# Patient Record
Sex: MALE | Race: WHITE | NOT HISPANIC OR LATINO
[De-identification: names, ages, dates, MRNs, and addresses within clinical notes are randomized per-mention and may not be internally consistent; named-entity substitution may affect disease eponyms.]

---

## 2018-10-02 ENCOUNTER — APPOINTMENT (OUTPATIENT)
Dept: ORTHOPEDIC SURGERY | Facility: CLINIC | Age: 58
End: 2018-10-02
Payer: COMMERCIAL

## 2018-10-02 VITALS
SYSTOLIC BLOOD PRESSURE: 132 MMHG | BODY MASS INDEX: 25.77 KG/M2 | DIASTOLIC BLOOD PRESSURE: 76 MMHG | WEIGHT: 180 LBS | HEIGHT: 70 IN | HEART RATE: 60 BPM

## 2018-10-02 DIAGNOSIS — Z78.9 OTHER SPECIFIED HEALTH STATUS: ICD-10-CM

## 2018-10-02 DIAGNOSIS — M25.531 PAIN IN RIGHT WRIST: ICD-10-CM

## 2018-10-02 DIAGNOSIS — M25.831 OTHER SPECIFIED JOINT DISORDERS, RIGHT WRIST: ICD-10-CM

## 2018-10-02 DIAGNOSIS — M25.532 PAIN IN LEFT WRIST: ICD-10-CM

## 2018-10-02 DIAGNOSIS — M24.132 OTHER ARTICULAR CARTILAGE DISORDERS, LEFT WRIST: ICD-10-CM

## 2018-10-02 DIAGNOSIS — M24.131 OTHER ARTICULAR CARTILAGE DISORDERS, RIGHT WRIST: ICD-10-CM

## 2018-10-02 PROCEDURE — 20605 DRAIN/INJ JOINT/BURSA W/O US: CPT | Mod: LT

## 2018-10-02 PROCEDURE — 99203 OFFICE O/P NEW LOW 30 MIN: CPT | Mod: 25

## 2018-10-03 PROBLEM — M25.531 RIGHT WRIST PAIN: Status: ACTIVE | Noted: 2018-10-03

## 2018-10-03 PROBLEM — Z78.9 DOES NOT USE ILLICIT DRUGS: Status: ACTIVE | Noted: 2018-10-02

## 2018-10-03 PROBLEM — Z78.9 CONSUMES ALCOHOL OCCASIONALLY: Status: ACTIVE | Noted: 2018-10-02

## 2018-10-03 PROBLEM — Z78.9 CURRENT NON-SMOKER: Status: ACTIVE | Noted: 2018-10-02

## 2018-10-03 PROBLEM — M25.532 LEFT WRIST PAIN: Status: ACTIVE | Noted: 2018-10-03

## 2018-10-16 ENCOUNTER — TRANSCRIPTION ENCOUNTER (OUTPATIENT)
Age: 58
End: 2018-10-16

## 2020-01-06 ENCOUNTER — APPOINTMENT (OUTPATIENT)
Age: 60
End: 2020-01-06
Payer: COMMERCIAL

## 2020-01-06 VITALS
BODY MASS INDEX: 27.2 KG/M2 | HEIGHT: 70 IN | SYSTOLIC BLOOD PRESSURE: 134 MMHG | HEART RATE: 74 BPM | TEMPERATURE: 97.9 F | DIASTOLIC BLOOD PRESSURE: 85 MMHG | WEIGHT: 190 LBS

## 2020-01-06 DIAGNOSIS — M75.21 BICIPITAL TENDINITIS, RIGHT SHOULDER: ICD-10-CM

## 2020-01-06 DIAGNOSIS — M75.41 IMPINGEMENT SYNDROME OF RIGHT SHOULDER: ICD-10-CM

## 2020-01-06 DIAGNOSIS — S43.431A SUPERIOR GLENOID LABRUM LESION OF RIGHT SHOULDER, INITIAL ENCOUNTER: ICD-10-CM

## 2020-01-06 PROCEDURE — 73030 X-RAY EXAM OF SHOULDER: CPT | Mod: RT

## 2020-01-06 PROCEDURE — 99214 OFFICE O/P EST MOD 30 MIN: CPT | Mod: 25

## 2020-01-06 PROCEDURE — 20610 DRAIN/INJ JOINT/BURSA W/O US: CPT | Mod: RT

## 2020-01-16 NOTE — HISTORY OF PRESENT ILLNESS
[de-identified] : The patient comes in today.  He has been doing a lot of swimming.  He is having increasing complaints of pain to his right shoulder.  The patient states the onset/injury occurred on 10/20/19.  This injury is not work related or due to an automobile accident.  The patient states the pain is localized.  The patient describes the pain as sharp.  \par \par Pain level includes a current pain level of 6/10.

## 2020-01-16 NOTE — ADDENDUM
[FreeTextEntry1] : This note was written by Margareth Bellamy on 01/10/2020 acting as scribe for José Manuel Ziegler III, MD

## 2020-01-16 NOTE — DISCUSSION/SUMMARY
[de-identified] : At this time, I have recommended that he undergo an injection for the right shoulder impingement, biceps tendinitis and SLAP tear, as noted above.  I have recommended ice and elevation.  He will be reassessed in three to four weeks.

## 2020-01-16 NOTE — PROCEDURE
[de-identified] : Consent: \par At this time, I have recommended an injection to the right shoulder.  The risks and benefits of the procedure were discussed with the patient in detail.  Upon verbal consent of the patient, we proceeded with the injection as noted below.  \par \par Procedure:  \par After a sterile prep, the patient underwent an injection of 9 cc of 1% Lidocaine without epinephrine and 1 cc of Kenalog into the right shoulder.  The patient tolerated the procedure well.  There were no complications.

## 2020-01-16 NOTE — PHYSICAL EXAM
[de-identified] : Left Shoulder: \par Range of Motion in Degrees:   	\par    	                        Claimant:          Normal:  	\par Abduction (Active)  	180  	180 degrees  	\par Abduction (Passive)  	180  	180 degrees  	\par Forward elevation (Active):  	180  	180 degrees  	\par Forward elevation (Passive):  180  	180 degrees  	\par External rotation (Active):  	45  	45 degrees  	\par External rotation (Passive):  	45  	45 degrees  	\par Internal rotation (Active):  	L-1  	L-1  	\par Internal rotation (Passive):  	L-1  	L-1  	\par \par No motor weakness to internal rotation, external rotation or abduction in the scapular plane.  Negative crank test.  Negative O’Noah’s test.  Negative Speed’s test. Negative Yergason’s test.  Negative cross arm test.  No tenderness to palpation at the AC joint. Negative Hawkin’s sign.  Negative Neer’s sign.  Negative apprehension. Negative sulcus sign.  No gross neurological or vascular deficits distally.  2+ radial and ulnar pulses.  Skin is intact.  No rashes, scars or lesions.  No extra-articular swelling or tenderness. \par \par Right Shoulder:  \par Range of Motion in Degrees:	\par 	                                  Claimant:	Normal:	\par Abduction (Active)	                  180	               180 degrees	\par Abduction (Passive)	  180	               180 degrees	\par Forward elevation (Active):	  180	               180 degrees	\par Forward elevation (Passive):	  180	               180 degrees	\par External rotation (Active):	   45	               45 degrees	\par External rotation (Passive):	   45	               45 degrees	\par Internal rotation (Active):	   L-1	               L-1	\par Internal rotation (Passive):	   L-1	               L-1	\par  \par No motor weakness to internal rotation, external rotation or abduction in the scapular plane.  Positive crank test.  Positive O’Noah’s test.  Positive Speed’s test.  Positive Yergason’s test.  Negative cross arm test.  No tenderness to palpation at the AC joint. Positive Hawkin’s sign.  Positive Neer’s sign. Negative apprehension. Negative sulcus sign.  No gross neurological or vascular deficits distally.  Skin is intact.  No rashes, scars or lesions. 2+ radial and ulnar pulses. No extra-articular swelling or tenderness.\par  [de-identified] : Ambulating with a normal gait.  Station:  Normal.  [de-identified] : General Appearance:  Well-developed, well-nourished male in no acute distress. \par  [de-identified] : Radiographs, two views of the right shoulder, show no obvious osseous abnormalities.

## 2021-08-10 ENCOUNTER — APPOINTMENT (OUTPATIENT)
Dept: GASTROENTEROLOGY | Facility: CLINIC | Age: 61
End: 2021-08-10

## 2021-10-22 ENCOUNTER — TRANSCRIPTION ENCOUNTER (OUTPATIENT)
Age: 61
End: 2021-10-22

## 2023-05-24 ENCOUNTER — APPOINTMENT (OUTPATIENT)
Dept: GASTROENTEROLOGY | Facility: CLINIC | Age: 63
End: 2023-05-24
Payer: COMMERCIAL

## 2023-05-24 ENCOUNTER — TRANSCRIPTION ENCOUNTER (OUTPATIENT)
Age: 63
End: 2023-05-24

## 2023-05-24 VITALS
BODY MASS INDEX: 27.2 KG/M2 | DIASTOLIC BLOOD PRESSURE: 78 MMHG | WEIGHT: 190 LBS | HEIGHT: 70 IN | SYSTOLIC BLOOD PRESSURE: 124 MMHG

## 2023-05-24 DIAGNOSIS — Z86.010 PERSONAL HISTORY OF COLONIC POLYPS: ICD-10-CM

## 2023-05-24 PROCEDURE — 99203 OFFICE O/P NEW LOW 30 MIN: CPT

## 2023-05-24 RX ORDER — SODIUM SULFATE, POTASSIUM SULFATE AND MAGNESIUM SULFATE 1.6; 3.13; 17.5 G/177ML; G/177ML; G/177ML
17.5-3.13-1.6 SOLUTION ORAL
Qty: 1 | Refills: 0 | Status: ACTIVE | COMMUNITY
Start: 2023-05-24 | End: 1900-01-01

## 2023-05-24 NOTE — PHYSICAL EXAM

## 2023-05-24 NOTE — HISTORY OF PRESENT ILLNESS
[FreeTextEntry1] : Mr. KEE LEBRON is a 62 year old male presents for screening colonoscopy. Patient has no complaints of bowel issues, bleeding, abdominal pain, family history of colon cancer, GERD symptoms.  Patient had last colonoscopy in 2013.  Patient has a personal history of colon polyps.\par \par

## 2023-12-08 ENCOUNTER — APPOINTMENT (OUTPATIENT)
Dept: GASTROENTEROLOGY | Facility: AMBULATORY MEDICAL SERVICES | Age: 63
End: 2023-12-08
Payer: COMMERCIAL

## 2023-12-08 PROCEDURE — 45378 DIAGNOSTIC COLONOSCOPY: CPT | Mod: GC

## 2024-10-29 ENCOUNTER — APPOINTMENT (OUTPATIENT)
Dept: OTOLARYNGOLOGY | Facility: CLINIC | Age: 64
End: 2024-10-29
Payer: COMMERCIAL

## 2024-10-29 ENCOUNTER — NON-APPOINTMENT (OUTPATIENT)
Age: 64
End: 2024-10-29

## 2024-10-29 VITALS — HEIGHT: 70 IN | BODY MASS INDEX: 27.2 KG/M2 | WEIGHT: 190 LBS

## 2024-10-29 VITALS — SYSTOLIC BLOOD PRESSURE: 139 MMHG | HEART RATE: 73 BPM | DIASTOLIC BLOOD PRESSURE: 79 MMHG

## 2024-10-29 DIAGNOSIS — H61.23 IMPACTED CERUMEN, BILATERAL: ICD-10-CM

## 2024-10-29 DIAGNOSIS — J34.3 HYPERTROPHY OF NASAL TURBINATES: ICD-10-CM

## 2024-10-29 DIAGNOSIS — H69.93 UNSPECIFIED EUSTACHIAN TUBE DISORDER, BILATERAL: ICD-10-CM

## 2024-10-29 DIAGNOSIS — H60.543 ACUTE ECZEMATOID OTITIS EXTERNA, BILATERAL: ICD-10-CM

## 2024-10-29 DIAGNOSIS — H90.3 SENSORINEURAL HEARING LOSS, BILATERAL: ICD-10-CM

## 2024-10-29 DIAGNOSIS — J34.2 DEVIATED NASAL SEPTUM: ICD-10-CM

## 2024-10-29 DIAGNOSIS — J32.1 CHRONIC FRONTAL SINUSITIS: ICD-10-CM

## 2024-10-29 PROCEDURE — 92567 TYMPANOMETRY: CPT

## 2024-10-29 PROCEDURE — 92557 COMPREHENSIVE HEARING TEST: CPT

## 2024-10-29 PROCEDURE — 31231 NASAL ENDOSCOPY DX: CPT

## 2024-10-29 PROCEDURE — 99204 OFFICE O/P NEW MOD 45 MIN: CPT | Mod: 25

## 2024-10-29 RX ORDER — TADALAFIL 2.5 MG/1
TABLET, FILM COATED ORAL
Refills: 0 | Status: ACTIVE | COMMUNITY

## 2024-10-29 RX ORDER — ATORVASTATIN CALCIUM 80 MG/1
TABLET, FILM COATED ORAL
Refills: 0 | Status: ACTIVE | COMMUNITY

## 2024-10-29 RX ORDER — EZETIMIBE 10 MG/1
TABLET ORAL
Refills: 0 | Status: ACTIVE | COMMUNITY

## 2024-10-29 RX ORDER — MOMETASONE FUROATE 1 MG/G
0.1 OINTMENT TOPICAL
Qty: 1 | Refills: 2 | Status: ACTIVE | COMMUNITY
Start: 2024-10-29 | End: 1900-01-01

## 2024-11-21 ENCOUNTER — APPOINTMENT (OUTPATIENT)
Dept: OTOLARYNGOLOGY | Facility: CLINIC | Age: 64
End: 2024-11-21